# Patient Record
Sex: MALE | Race: WHITE
[De-identification: names, ages, dates, MRNs, and addresses within clinical notes are randomized per-mention and may not be internally consistent; named-entity substitution may affect disease eponyms.]

---

## 2019-06-13 ENCOUNTER — HOSPITAL ENCOUNTER (OUTPATIENT)
Dept: HOSPITAL 35 - CV | Age: 54
End: 2019-06-13
Attending: INTERNAL MEDICINE
Payer: COMMERCIAL

## 2019-06-13 DIAGNOSIS — I25.10: Primary | ICD-10-CM

## 2020-08-13 ENCOUNTER — HOSPITAL ENCOUNTER (OUTPATIENT)
Dept: HOSPITAL 35 - SJCVCIMAG | Age: 55
End: 2020-08-13
Attending: INTERNAL MEDICINE
Payer: COMMERCIAL

## 2020-08-13 DIAGNOSIS — I10: ICD-10-CM

## 2020-08-13 DIAGNOSIS — I25.10: ICD-10-CM

## 2020-08-13 DIAGNOSIS — I07.1: Primary | ICD-10-CM

## 2021-02-11 ENCOUNTER — HOSPITAL ENCOUNTER (OUTPATIENT)
Dept: HOSPITAL 35 - SJCVCIMAG | Age: 56
End: 2021-02-11
Attending: INTERNAL MEDICINE
Payer: COMMERCIAL

## 2021-02-11 DIAGNOSIS — I10: ICD-10-CM

## 2021-02-11 DIAGNOSIS — I49.3: ICD-10-CM

## 2021-02-11 DIAGNOSIS — E11.9: ICD-10-CM

## 2021-02-11 DIAGNOSIS — Z98.61: ICD-10-CM

## 2021-02-11 DIAGNOSIS — E78.5: ICD-10-CM

## 2021-02-11 DIAGNOSIS — I25.10: Primary | ICD-10-CM

## 2021-02-15 ENCOUNTER — HOSPITAL ENCOUNTER (OUTPATIENT)
Dept: HOSPITAL 35 - CATH | Age: 56
Discharge: HOME | End: 2021-02-15
Attending: INTERNAL MEDICINE
Payer: COMMERCIAL

## 2021-02-15 VITALS — WEIGHT: 248.46 LBS | HEIGHT: 73 IN | BODY MASS INDEX: 32.93 KG/M2

## 2021-02-15 VITALS — SYSTOLIC BLOOD PRESSURE: 178 MMHG | DIASTOLIC BLOOD PRESSURE: 151 MMHG

## 2021-02-15 DIAGNOSIS — Z90.49: ICD-10-CM

## 2021-02-15 DIAGNOSIS — I10: ICD-10-CM

## 2021-02-15 DIAGNOSIS — R94.39: ICD-10-CM

## 2021-02-15 DIAGNOSIS — Z98.890: ICD-10-CM

## 2021-02-15 DIAGNOSIS — R60.9: ICD-10-CM

## 2021-02-15 DIAGNOSIS — Z82.49: ICD-10-CM

## 2021-02-15 DIAGNOSIS — I49.9: ICD-10-CM

## 2021-02-15 DIAGNOSIS — Z79.899: ICD-10-CM

## 2021-02-15 DIAGNOSIS — I47.2: ICD-10-CM

## 2021-02-15 DIAGNOSIS — I25.10: Primary | ICD-10-CM

## 2021-02-15 DIAGNOSIS — E78.00: ICD-10-CM

## 2021-02-15 DIAGNOSIS — Z79.84: ICD-10-CM

## 2021-02-15 DIAGNOSIS — Z79.82: ICD-10-CM

## 2021-02-15 DIAGNOSIS — K21.9: ICD-10-CM

## 2021-02-15 DIAGNOSIS — Z88.8: ICD-10-CM

## 2021-02-15 DIAGNOSIS — E11.9: ICD-10-CM

## 2021-02-15 DIAGNOSIS — Z87.442: ICD-10-CM

## 2021-02-15 NOTE — CATHLAB
[facility]
[address]
[place]   [address]                   INVASIVE PROCEDURE REPORT     
_______________________________________________________________________________
 
Name:       BESSY ROSENBERG            Room #:                     [facility].#:      [id number]                       Account #:      [id number]  
Admission:  02/15/21    Attend Phys:    Vance Rueda MD       
Discharge:              Date of Birth:  [birth year]  
                                                          Report #: [id number]
                                                                    [id number]
_______________________________________________________________________________
THIS REPORT FOR:  
 
cc:  Samuel Nolasco Damon DO Park, Jin S. MD                                                   
                                                                       ~
 
--------------- APPROVED REPORT --------------
 
 
Study performed:  02/15/2021 09:42:01
 
Patient Details
Patient Status: Out-Patient                  Room #: 
The patient is a 55 year-old male
 
Event Personnel
Vance Rueda  Interventional Cardiologist, Josselyn Alfonso RN RN, 
Nicolas Dee RTR Monitor, Maliha Kingsley Spooner, Carly 
RTR Circulator
 
Procedures Performed
Art Access - R femoral artery*  Left Heart Cath w/or w/o Coronaries 
[id number] [facility] Hemostasis with Manual pressure  02690 Initial Mod Sed 
Same Phys/QHP Gr5y 875236 18160 Mod Sed Same Phys/QHP Ea 
[id number]
 
Indication
Arrhythmia, Positive stress test, The patient developed nonsustained 
VT during the exercise portion.
 
Risk Factors
Hypercholesterolemia, Coronary Artery DiseaseHypertension, Diabetes 
 
Procedure Narrative
The Right Groin^ was infiltrated with 1% Lidocaine subcutaneous 
anesthesia. A PINNACLE 4FR Sheath #[id number] sheath was inserted into 
the RFA^. Coronary angiography was performed using coronary 
diagnostic catheters. The right coronary system was accessed and 
visualized with a JR4 catheter. The left coronary system was accessed 
and visualized with a JL4 catheter. The left ventricle was accessed 
and visualized with a PIGTAIL catheter. Left ventricular/Aortic Valve 
gradient assessed via catheter pullback. Left ventriculogram was 
performed in 30 degree projection. Hemostasis was obtained with 
manual pressure following sheath removal without any complications. 
The patient tolerated the procedure well and there were no 
 
 
[facility]
[address]
[place]  [address]
Phone:  [phone]                    INVASIVE PROCEDURE REPORT     
_______________________________________________________________________________
 
Name:            BESSY ROSENBERG            Room #:                    REG [facility]#:           [id number]          Account #:     [id number]  
Admission:       02/15/21         Attend Phys:   Vance Rueda MD   
Discharge:                  Date of Birth: [birth year]  
                         Report #:      [id number]
        [id number]
_______________________________________________________________________________
complications associated with the procedure. There was no 
hematoma.
 
Intraoperative Conscious Sedation
Sedation start time:  1019           Case end Time:  
1055    
Fentanyl  50 mcg    Versed  1 mg  
 
Fluoro Time:    3.60 minutes     
Dose:     DAP 96628.40 cGycm2  1558 mGy  
Contrast Type and Amount:  Omnipaque 120 ml    
 
Coronary Angiography
The patient's coronary anatomy is right dominant. 
 
Diagnostic Cath
Left Main The left main artery is a large-caliber vessel, patent with 
no flow-limiting lesions.
LAD  The LAD is a moderate-sized caliber vessel, traverses the 
anterior wall and terminates at the apex.  There is a stent in the 
midsegment, patent with minimal restenosis.  There is mild disease in 
the proximal segment, 20%.
Diagonal 1 There is a moderate-sized caliber vessel, with mild 
disease proximally.
Diagonal 2 This is a moderate-sized caliber vessel with mild to 
moderate disease in the proximal segment, 30 to 40%.
Circumflex The left circumflex artery is patent, supplying 2 OM 
vessels.
OM1  There is a stent in the proximal segment, patent with minimal 
restenosis.
OM2  This is a moderate-sized caliber vessel, patent with no 
flow-limiting lesions.
Right Coronary The RCA is a moderate to large caliber vessel, 
tortuous as it travels the AV groove.  This vessel is patent with no 
flow-limiting lesions.
R PDA  This is a moderate-sized caliber vessel, patent with no 
flow-limiting lesions.
RPLV  This is a moderate-sized caliber vessel, patent with no 
flow-limiting lesions.  
 
Left Ventriculography
The left ventricle is normal in size with normal contractility. The 
left ventricular ejection fraction is estimated to be 
>55%.
 
Hemodynamics
 
 
[facility]
[address]
[place]  [address]
Phone:  [phone]                    INVASIVE PROCEDURE REPORT     
_______________________________________________________________________________
 
Name:            BESSY ROSENBERG JAKE            Room #:                    REG [facility]
..#:           [id number]          Account #:     [id number]  
Admission:       02/15/21         Attend Phys:   Vance Rueda MD   
Discharge:                  Date of Birth: [birth year]  
                         Report #:      [id number]
        [id number]
_______________________________________________________________________________
The aortic pressure is 126/70 mmHg with a mean of 80 mmHg. The left 
ventricular pressure is 122/11 mmHg with a mean of mmHg. The left 
ventricular end diastolic pressure is 23 mmHg. 
 
Conclusion
1.  There is a patent stent in the mid LAD, with minimal 
restenosis.
2.  There is a patent stent in OM1, with minimal restenosis.
3.  There is mild to moderate disease in the first and second 
diagonal arteries.
4.  There is normal LV systolic function.
5.  Recommend aggressive risk factor management
 
 
 
 
 
 
 
 
 
 
 
 
 
 
 
 
 
 
 
 
 
 
 
 
 
 
 
 
 
 
 
 
  <ELECTRONICALLY SIGNED>
   By: Vance Rueda MD               
  02/15/21     1208
D: 02/15/21 1208                           _____________________________________
T: 02/15/21 1208                           Vance Rueda MD                 /INF